# Patient Record
Sex: FEMALE | Employment: UNEMPLOYED | URBAN - NONMETROPOLITAN AREA
[De-identification: names, ages, dates, MRNs, and addresses within clinical notes are randomized per-mention and may not be internally consistent; named-entity substitution may affect disease eponyms.]

---

## 2017-01-06 ENCOUNTER — TELEPHONE (OUTPATIENT)
Dept: SURGERY | Age: 62
End: 2017-01-06

## 2017-02-14 ENCOUNTER — HOSPITAL ENCOUNTER (OUTPATIENT)
Dept: HOSPITAL 58 - RAD | Age: 62
Discharge: HOME | End: 2017-02-14
Attending: SURGERY

## 2017-02-14 VITALS — BODY MASS INDEX: 29.2 KG/M2

## 2017-02-14 DIAGNOSIS — Z12.31: Primary | ICD-10-CM

## 2017-02-15 NOTE — MAMMO
EXAM:  Digital screening mammogram 

  

HISTORY:  Screening mammogram 

  

COMPARISON: Mammogram 02/19/2016 and 02/09/2016 and outside report from 6-month follow-up ultrasound
 

  

FINDINGS:  Bilateral CC and MLO views of the breasts were performed digitally and demonstrate fatty 
breast density (up to 25%). A small nodular density in the inferior medial left breast is unchanged 
from prior exam.  View right breast nodules are unchanged since 02/19/2016.  These were described as
 BIRADS III on prior diagnostic mammogram and 6-month follow-up was recommended. This was performed 
at outside facility. There is no new nodule or calcification identified. 

  

IMPRESSION:  No change in bilateral breast nodules from 02/19/2016.  These were called simple cysts 
by outside ultrasound. 

  

RECOMMENDATION: 

  

Annual screening mammogram 

  

BIRADS category II:  Benign findings

## 2017-02-16 ENCOUNTER — OFFICE VISIT (OUTPATIENT)
Dept: SURGERY | Age: 62
End: 2017-02-16
Payer: COMMERCIAL

## 2017-02-16 VITALS
SYSTOLIC BLOOD PRESSURE: 132 MMHG | DIASTOLIC BLOOD PRESSURE: 80 MMHG | BODY MASS INDEX: 31.01 KG/M2 | HEIGHT: 63 IN | WEIGHT: 175 LBS | HEART RATE: 76 BPM

## 2017-02-16 DIAGNOSIS — Z12.31 VISIT FOR SCREENING MAMMOGRAM: Primary | ICD-10-CM

## 2017-02-16 PROCEDURE — 99213 OFFICE O/P EST LOW 20 MIN: CPT | Performed by: SURGERY

## 2017-10-11 ENCOUNTER — HOSPITAL ENCOUNTER (OUTPATIENT)
Dept: HOSPITAL 58 - RAD | Age: 62
Discharge: HOME | End: 2017-10-11
Attending: CHIROPRACTOR

## 2017-10-11 VITALS — BODY MASS INDEX: 29.2 KG/M2

## 2017-10-11 DIAGNOSIS — M99.04: Primary | ICD-10-CM

## 2017-10-11 NOTE — DI
Exam:  Single x-ray of the pelvis. 

  

Comparison:  CT abdomen pelvis performed on 09/15/2015. 

  

Reason for exam:  Segmental and somatic dysfunction of sacral region. 

  

FINDINGS: 

  

Image interpretation is somewhat limited by the single x-ray.  The pelvic ring appears intact.  The s
acroiliac joint spaces appear symmetric.  The femoral heads articulate with the acetabula.  The symph
ysis pubis is unremarkable. 

  

Impression:  No acute fracture or malalignment is seen within the pelvis.

## 2018-01-03 ENCOUNTER — TELEPHONE (OUTPATIENT)
Dept: SURGERY | Age: 63
End: 2018-01-03

## 2018-01-03 DIAGNOSIS — Z12.31 VISIT FOR SCREENING MAMMOGRAM: Primary | ICD-10-CM

## 2018-01-03 NOTE — TELEPHONE ENCOUNTER
Left VM for patient of appointments for:    ELOY at [facility] on 2/15/2018 at 9 Am  Will see Catalina Lopez for a breast exam following ELOY at 10 Am. I also sent appointment cards in the mail for verification.

## 2018-06-28 ENCOUNTER — TELEPHONE (OUTPATIENT)
Dept: SURGERY | Age: 63
End: 2018-06-28

## 2018-08-31 ENCOUNTER — HOSPITAL ENCOUNTER (OUTPATIENT)
Dept: HOSPITAL 58 - LAB | Age: 63
Discharge: HOME | End: 2018-08-31
Attending: FAMILY MEDICINE

## 2018-08-31 VITALS — BODY MASS INDEX: 29.2 KG/M2

## 2018-08-31 DIAGNOSIS — J01.90: ICD-10-CM

## 2018-08-31 DIAGNOSIS — Z72.0: ICD-10-CM

## 2018-08-31 DIAGNOSIS — R19.5: Primary | ICD-10-CM

## 2018-08-31 PROCEDURE — 36415 COLL VENOUS BLD VENIPUNCTURE: CPT

## 2019-06-05 ENCOUNTER — HOSPITAL ENCOUNTER (OUTPATIENT)
Dept: HOSPITAL 58 - RAD | Age: 64
Discharge: HOME | End: 2019-06-05
Attending: FAMILY MEDICINE
Payer: COMMERCIAL

## 2019-06-05 VITALS — BODY MASS INDEX: 29.2 KG/M2

## 2019-06-05 DIAGNOSIS — R10.30: Primary | ICD-10-CM

## 2019-06-05 NOTE — CT
EXAM:  CT ABDOMEN AND PELVIS 

  

HISTORY:  Lower abdominal pain 

  

TECHNIQUE:  CT abdomen and pelvis without intravenous contrast.  Images were reconstructed using 5 mm
 section thickness.  Reformations were prepared. 

COMPARISON: 09/15/2015 

  

FINDINGS: 

  

Diagnostic limitations may exist without including contrast enhanced images.  No focal hepatic lesion
s.  A few splenic calcifications are present.  Gallbladder, pancreas and adrenal glands appear normal
.  Kidneys have a few punctate internal calcifications which could represent renal stones or vascular
 calcifications.  No hydronephrosis.  There is moderately severe atherosclerotic disease. 

  

Stomach is within normal limits.  There is diverticulosis of the distal descending and sigmoid colon 
which is mild to moderate in degree.  At the level of the distal descending/sigmoid junction, the div
erticula have moderate surrounding inflammatory infiltration consistent with diverticulitis.  There i
s no bowel obstruction, current abscess or free air.  Uterus is either small or absent.  Urinary blad
oj is within normal limits. 

  

No ventral abdominal wall hernia.  Moderate degenerative changes of the lower lumbar spine.  There is
 a small micro nodule in the anterior right lung base measuring about 3 mm unchanged since previous e
xam and considered benign. 

  

  

IMPRESSION: 

1.  Unexpected finding.  Mild to moderate diverticulitis at the distal descending and sigmoid junctio
n with no bowel obstruction, free air or current abscess. 

2.  Atherosclerotic disease.

## 2021-12-28 ENCOUNTER — OFFICE VISIT (OUTPATIENT)
Dept: PRIMARY CARE CLINIC | Age: 66
End: 2021-12-28
Payer: COMMERCIAL

## 2021-12-28 VITALS
SYSTOLIC BLOOD PRESSURE: 136 MMHG | WEIGHT: 148 LBS | OXYGEN SATURATION: 97 % | HEIGHT: 62 IN | TEMPERATURE: 98.2 F | DIASTOLIC BLOOD PRESSURE: 86 MMHG | BODY MASS INDEX: 27.23 KG/M2 | HEART RATE: 91 BPM

## 2021-12-28 DIAGNOSIS — Z76.89 ENCOUNTER TO ESTABLISH CARE: Primary | ICD-10-CM

## 2021-12-28 PROCEDURE — 99203 OFFICE O/P NEW LOW 30 MIN: CPT | Performed by: FAMILY MEDICINE

## 2021-12-28 RX ORDER — LOSARTAN POTASSIUM 50 MG/1
50 TABLET ORAL DAILY
COMMUNITY
Start: 2021-10-21

## 2021-12-28 RX ORDER — ZINC GLUCONATE 50 MG
50 TABLET ORAL DAILY
COMMUNITY

## 2021-12-28 RX ORDER — CALCIUM CARBONATE/VITAMIN D3 500-10/5ML
2 LIQUID (ML) ORAL
COMMUNITY

## 2021-12-28 SDOH — ECONOMIC STABILITY: FOOD INSECURITY: WITHIN THE PAST 12 MONTHS, THE FOOD YOU BOUGHT JUST DIDN'T LAST AND YOU DIDN'T HAVE MONEY TO GET MORE.: NEVER TRUE

## 2021-12-28 SDOH — ECONOMIC STABILITY: FOOD INSECURITY: WITHIN THE PAST 12 MONTHS, YOU WORRIED THAT YOUR FOOD WOULD RUN OUT BEFORE YOU GOT MONEY TO BUY MORE.: NEVER TRUE

## 2021-12-28 ASSESSMENT — PATIENT HEALTH QUESTIONNAIRE - PHQ9
1. LITTLE INTEREST OR PLEASURE IN DOING THINGS: 0
SUM OF ALL RESPONSES TO PHQ QUESTIONS 1-9: 1
SUM OF ALL RESPONSES TO PHQ9 QUESTIONS 1 & 2: 1
SUM OF ALL RESPONSES TO PHQ QUESTIONS 1-9: 1
SUM OF ALL RESPONSES TO PHQ QUESTIONS 1-9: 1
2. FEELING DOWN, DEPRESSED OR HOPELESS: 1

## 2021-12-28 ASSESSMENT — SOCIAL DETERMINANTS OF HEALTH (SDOH): HOW HARD IS IT FOR YOU TO PAY FOR THE VERY BASICS LIKE FOOD, HOUSING, MEDICAL CARE, AND HEATING?: NOT HARD AT ALL

## 2021-12-28 NOTE — PROGRESS NOTES
[facility]  [address] [address]  [address] [address]  Dept: [phone]  Dept Fax: [phone]  Loc: [phone]      Subjective:     Chief Complaint   Patient presents with    New Patient    Anxiety     patient was taking vitamin b12 shots which were controlling her panic attacks and helping with hair loss. She has not had a b12 shot in a while and her panic attacks have come back.  Fatigue       HPI:  Yareli Rogers is a 61 y.o. female presents today a s a new patient. She used to go to a physicain in [place] where she is from. She presents with 10 yrs hx of several different non specific symptoms that include hair loss, joint pains, chronic fatigue. She states she ha several food allergies and over the years have really worked hard on eating organic foods mostly. She claims to be a very healthy eater. She had seen a Rheumatologist in the past who she states wanted to put her ot MTX which she refused. Pt states she is very sensitive to chemicals. She never went back for follow-up. Pt  believes that she has some form of autoimmune disorder and she claims that no one seems to be interested in helping her find answer to all her symptoms. Meanwhile, she was receiving  Vit B 12 shots from her previous PCP for pernicious anemia. Her Intrinsic Factor was fd to be abnormal. She states she could not tolerate the cyanocobalamin shot she was getting so she is supplementing on a Vit B 12 drops. She states that the dose is not strong enough. She is requesting an injection of the methylated Vit B 12. Pt states she does not do well with chemicals/drugs. She prefers to take holistic/homeopathic medicines. ROS:   Review of Systems   Constitutional: Positive for fatigue. Musculoskeletal: Positive for arthralgias. Allergic/Immunologic: Positive for food allergies.        PMHx:  Past Medical History:   Diagnosis Date    Hypertension      There is no problem list on file for this patient. PSHx:  Past Surgical History:   Procedure Laterality Date     SECTION  [id number]    X 2    HYSTERECTOMY, TOTAL ABDOMINAL         PFHx:  Family History   Problem Relation Age of Onset    Lung Cancer Mother     Cancer Maternal Grandmother         Melanoma       SocialHx:  Social History     Tobacco Use    Smoking status: Current Every Day Smoker     Packs/day: 0.50     Years: 40.00     Pack years: 20.00    Smokeless tobacco: Never Used   Substance Use Topics    Alcohol use: Yes     Comment: occassional       Allergies: Allergies   Allergen Reactions    Gluten Meal        Medications:  Current Outpatient Medications   Medication Sig Dispense Refill    losartan (COZAAR) 50 MG tablet Take 50 mg by mouth daily      zinc 50 MG TABS tablet Take 50 mg by mouth daily      COCONUT OIL PO Take by mouth      L-GLUTAMINE PO Take 1 tablet by mouth daily      COLOSTRUM PO Take by mouth      Copper Gluconate (COPPER CAPS) 2 MG CAPS Take 2 mg by mouth      vitamin D (CHOLECALCIFEROL) 1000 UNIT TABS tablet Take 1,000 Units by mouth daily       No current facility-administered medications for this visit. Objective:   PE:  /86   Pulse 91   Temp 98.2 °F (36.8 °C)   Ht 5' 2\" (1.575 m)   Wt 148 lb (67.1 kg)   SpO2 97%   BMI 27.07 kg/m²   Physical Exam       Assessment & Plan   Isabelle Gonsalves was seen today for new patient, anxiety and fatigue. Diagnoses and all orders for this visit:    Encounter to establish care    After a very extensive review & discussion with patient about her health history and presenting symptoms, I told her that I may not be a good fit doctor for her because I do not practice holistic or homeopathic medicine which is clearly what she wants. I also told her that the only available Vit B 12 shot I can give her is the cyanocobalamin and she did not want this.   I graciously asked the patient to look for another medical provider who